# Patient Record
Sex: FEMALE | Race: WHITE | Employment: FULL TIME | URBAN - NONMETROPOLITAN AREA
[De-identification: names, ages, dates, MRNs, and addresses within clinical notes are randomized per-mention and may not be internally consistent; named-entity substitution may affect disease eponyms.]

---

## 2018-12-20 PROBLEM — I34.1 MITRAL VALVE PROLAPSE: Status: ACTIVE | Noted: 2018-12-20

## 2023-03-24 ENCOUNTER — OFFICE VISIT (OUTPATIENT)
Dept: CARDIOLOGY | Age: 63
End: 2023-03-24
Payer: COMMERCIAL

## 2023-03-24 VITALS
SYSTOLIC BLOOD PRESSURE: 110 MMHG | HEART RATE: 81 BPM | DIASTOLIC BLOOD PRESSURE: 66 MMHG | WEIGHT: 243 LBS | BODY MASS INDEX: 41.71 KG/M2

## 2023-03-24 DIAGNOSIS — I34.1 MITRAL VALVE PROLAPSE: Primary | ICD-10-CM

## 2023-03-24 DIAGNOSIS — E78.5 HYPERLIPIDEMIA, UNSPECIFIED HYPERLIPIDEMIA TYPE: ICD-10-CM

## 2023-03-24 PROCEDURE — 93000 ELECTROCARDIOGRAM COMPLETE: CPT | Performed by: SURGERY

## 2023-03-24 PROCEDURE — 99214 OFFICE O/P EST MOD 30 MIN: CPT | Performed by: SURGERY

## 2023-03-24 NOTE — PROGRESS NOTES
mg by mouth daily    Historical Provider, MD   Cholecalciferol (VITAMIN D) 2000 units CAPS capsule Take 1 capsule by mouth daily    Historical Provider, MD   Lactobacillus (PROBIOTIC ACIDOPHILUS PO) Take 1 tablet by mouth    Historical Provider, MD   NONFORMULARY 1 capsule every other day Tumeric    Historical Provider, MD   vitamin B-12 (CYANOCOBALAMIN) 500 MCG tablet Take 500 mcg by mouth daily    Historical Provider, MD       Allergies:  Sulfa antibiotics    Social History:   reports that she quit smoking about 8 years ago. Her smoking use included cigarettes. She started smoking about 53 years ago. She has a 6.25 pack-year smoking history. She has never used smokeless tobacco. She reports current alcohol use. She reports that she does not use drugs. Review of Systems:  Review of Systems   Constitutional:  Negative for chills, fatigue and fever. HENT:  Negative for congestion and dental problem. Respiratory:  Negative for chest tightness, shortness of breath and stridor. Cardiovascular:  Negative for chest pain and palpitations. Gastrointestinal:  Negative for abdominal distention, nausea and vomiting. Endocrine: Negative for cold intolerance and heat intolerance. Neurological:  Negative for dizziness and speech difficulty. Psychiatric/Behavioral:  Negative for agitation and behavioral problems. Physical Exam:  There were no vitals taken for this visit. Physical Exam  Constitutional:       Appearance: Normal appearance. HENT:      Head: Normocephalic and atraumatic. Mouth/Throat:      Mouth: Mucous membranes are moist.   Cardiovascular:      Rate and Rhythm: Normal rate and regular rhythm. Heart sounds: No murmur heard. Pulmonary:      Effort: Pulmonary effort is normal. No respiratory distress. Breath sounds: Normal breath sounds. Abdominal:      General: There is no distension. Palpations: There is no mass.    Musculoskeletal:         General: No swelling or

## 2023-09-05 DIAGNOSIS — I10 ESSENTIAL HYPERTENSION: ICD-10-CM

## 2023-09-05 RX ORDER — LOSARTAN POTASSIUM 100 MG/1
50 TABLET ORAL DAILY
Qty: 45 TABLET | Refills: 3 | Status: SHIPPED | OUTPATIENT
Start: 2023-09-05

## 2023-09-05 NOTE — TELEPHONE ENCOUNTER
Last Appt:  3/24/2023  Next Appt:   Visit date not found  Med verified in [address] [place] [address]

## 2023-11-27 RX ORDER — SPIRONOLACTONE 25 MG/1
25 TABLET ORAL DAILY
Qty: 90 TABLET | Refills: 3 | Status: SHIPPED | OUTPATIENT
Start: 2023-11-27

## 2024-04-08 ENCOUNTER — OFFICE VISIT (OUTPATIENT)
Dept: CARDIOLOGY | Age: 64
End: 2024-04-08
Payer: COMMERCIAL

## 2024-04-08 VITALS
BODY MASS INDEX: 40.63 KG/M2 | SYSTOLIC BLOOD PRESSURE: 136 MMHG | WEIGHT: 238 LBS | OXYGEN SATURATION: 99 % | HEART RATE: 73 BPM | DIASTOLIC BLOOD PRESSURE: 74 MMHG | HEIGHT: 64 IN

## 2024-04-08 DIAGNOSIS — E78.5 HYPERLIPIDEMIA, UNSPECIFIED HYPERLIPIDEMIA TYPE: ICD-10-CM

## 2024-04-08 DIAGNOSIS — I10 ESSENTIAL HYPERTENSION: Primary | ICD-10-CM

## 2024-04-08 PROCEDURE — 3078F DIAST BP <80 MM HG: CPT | Performed by: INTERNAL MEDICINE

## 2024-04-08 PROCEDURE — 93000 ELECTROCARDIOGRAM COMPLETE: CPT | Performed by: INTERNAL MEDICINE

## 2024-04-08 PROCEDURE — 3075F SYST BP GE 130 - 139MM HG: CPT | Performed by: INTERNAL MEDICINE

## 2024-04-08 PROCEDURE — 99214 OFFICE O/P EST MOD 30 MIN: CPT | Performed by: INTERNAL MEDICINE

## 2024-04-08 NOTE — PROGRESS NOTES
[facility], [facility] CARDIOLOG[facility] A DEPARTMENT OF [place] [address]  Dept: [phone]    Subjective:  The patient is a 63 y.o. year old, , female is in the office for a follow up visit.  She denies any chest pain or discomfort. No orthopnea or PND. Denies any palpitation, dizziness or syncope. She is completely asymptomatic from cardiac stand point.    Past Medical History:   has a past medical history of Asthma, Bilateral bunions, GERD (gastroesophageal reflux disease), Hyperopia with astigmatism and presbyopia, Kidney stones, Osteopenia, Seasonal allergies, and Vitamin D deficiency.    Past Surgical History:   has a past surgical history that includes Colonoscopy (7/3/2013); Wrist surgery (Right, 8/1/2008); Endometrial ablation (11/10/2003); hysteroscopy (8/14/1997); other surgical history (8/31/1993); pelvic laparoscopy (5/11/1993); hysteroscopy (6/7/1991); Dilation and curettage of uterus (6/2/1989); salpingectomy (1/17/1985); Dilation and curettage of uterus (4/18/1978); Tubal ligation; Hysterectomy (6/28/2004); Breast biopsy (Bilateral, 3/2013); Upper gastrointestinal endoscopy (12/30/2015); hip surgery (Right, 04/05/2019); eye surgery (Right, 03/11/2020); and Cataract removal (Right, 09/29/2020).    Home Medications:  Prior to Admission medications    Medication Sig Start Date End Date Taking? Authorizing Provider   spironolactone (ALDACTONE) 25 MG tablet Take 1 tablet by mouth daily 11/27/23  Yes Shaila Lara APRN - CNP   losartan (COZAAR) 100 MG tablet Take 0.5 tablets by mouth daily 9/5/23  Yes Shaila Lara APRN - CNP   albuterol sulfate  (90 Base) MCG/ACT inhaler INHALE 2 PUFFS BY MOUTH EVERY 4 HOURS AS NEEDED FOR WHEEZING FOR SHORTNESS OF BREATH 2/23/22  Yes Provider, MD Hal   fluticasone (FLONASE) 50 MCG/ACT nasal spray 1 spray by Nasal route daily

## 2024-05-21 DIAGNOSIS — I10 ESSENTIAL HYPERTENSION: ICD-10-CM

## 2024-05-21 RX ORDER — SPIRONOLACTONE 25 MG/1
25 TABLET ORAL DAILY
Qty: 90 TABLET | Refills: 3 | Status: SHIPPED | OUTPATIENT
Start: 2024-05-21

## 2024-05-21 RX ORDER — LOSARTAN POTASSIUM 100 MG/1
50 TABLET ORAL DAILY
Qty: 45 TABLET | Refills: 3 | Status: SHIPPED | OUTPATIENT
Start: 2024-05-21

## 2024-10-07 ENCOUNTER — OFFICE VISIT (OUTPATIENT)
Dept: CARDIOLOGY | Age: 64
End: 2024-10-07
Payer: COMMERCIAL

## 2024-10-07 VITALS
HEIGHT: 64 IN | DIASTOLIC BLOOD PRESSURE: 86 MMHG | HEART RATE: 68 BPM | BODY MASS INDEX: 39.95 KG/M2 | WEIGHT: 234 LBS | SYSTOLIC BLOOD PRESSURE: 164 MMHG

## 2024-10-07 DIAGNOSIS — I34.1 MITRAL VALVE PROLAPSE: Primary | ICD-10-CM

## 2024-10-07 DIAGNOSIS — I10 ESSENTIAL HYPERTENSION: ICD-10-CM

## 2024-10-07 PROCEDURE — 93000 ELECTROCARDIOGRAM COMPLETE: CPT | Performed by: NURSE PRACTITIONER

## 2024-10-07 PROCEDURE — 99214 OFFICE O/P EST MOD 30 MIN: CPT | Performed by: NURSE PRACTITIONER

## 2024-10-07 PROCEDURE — 3079F DIAST BP 80-89 MM HG: CPT | Performed by: NURSE PRACTITIONER

## 2024-10-07 PROCEDURE — 3077F SYST BP >= 140 MM HG: CPT | Performed by: NURSE PRACTITIONER

## 2024-10-07 RX ORDER — LOSARTAN POTASSIUM 50 MG/1
50 TABLET ORAL DAILY
Qty: 90 TABLET | Refills: 3 | Status: SHIPPED | OUTPATIENT
Start: 2024-10-07

## 2024-10-07 NOTE — PROGRESS NOTES
icterus.  ENT: No Headaches, hearing loss or vertigo. No mouth sores or sore throat.  Cardiovascular: As above.  Respiratory: No SOB, cough or hemoptysis.  Gastrointestinal: No abdominal pain, appetite loss, blood in stools. No change in bowel or bladder habits.  Genitourinary: No dysuria, trouble voiding, or hematuria.  Musculoskeletal:  No gait disturbance, No weakness or joint complaints.  Integumentary: No rash or pruritis.  Psychiatric: No anxiety, or depression.  Hematologic/Lymphatic: No abnormal bruising or bleeding, blood clots or swollen lymph nodes.  Allergic/Immunologic: No nasal congestion or hives.    Physical Exam:  Ht 1.619 m (5' 3.75\")   Wt 106.1 kg (234 lb)   BMI 40.48 kg/m²     Constitutional and General Appearance: alert, cooperative, no distress and appears stated age  HEENT: PERRL, no cervical lymphadenopathy. No masses palpable. Normal oral mucosa  Respiratory:  Normal excursion and expansion without use of accessory muscles  Resp Auscultation: Good respiratory effort. No for increased work of breathing. On auscultation: clear to auscultation bilaterally  Cardiovascular:  The apical impulse is not displaced  Heart tones are crisp and normal. regular S1 and S2.  Jugular venous pulsation Normal  The carotid upstroke is normal in amplitude and contour without delay or bruit  Peripheral pulses are symmetrical and full   Abdomen:   No masses or tenderness  Bowel sounds present  Extremities:   No Cyanosis or Clubbing   Lower extremity edema: No   Skin: Warm and dry    Cardiac Data:  EKG 10/7/24:   NSR pulse 68      Echo 5/25/18  Summary  Normal left ventricle size, wall thickness and function with an estimated EF  > 55%.  No segmental wall motion abnormalities seen.  Grade I (mild) left ventricular diastolic dysfunction.  Trivial tricuspid regurgitation.  RVSP is 34 mm Hg     Labs:     CBC: No results for input(s): \"WBC\", \"HGB\", \"HCT\", \"PLT\" in the last 72 hours.  BMP: No results for input(s):

## 2025-02-12 RX ORDER — SPIRONOLACTONE 25 MG/1
25 TABLET ORAL DAILY
Qty: 90 TABLET | Refills: 3 | Status: SHIPPED | OUTPATIENT
Start: 2025-02-12

## 2025-02-24 DIAGNOSIS — M79.671 RIGHT FOOT PAIN: ICD-10-CM

## 2025-02-24 DIAGNOSIS — M79.672 LEFT FOOT PAIN: Primary | ICD-10-CM

## 2025-03-06 ENCOUNTER — OFFICE VISIT (OUTPATIENT)
Dept: ORTHOPEDIC SURGERY | Age: 65
End: 2025-03-06
Payer: MEDICARE

## 2025-03-06 ENCOUNTER — TELEPHONE (OUTPATIENT)
Dept: ORTHOPEDIC SURGERY | Age: 65
End: 2025-03-06

## 2025-03-06 ENCOUNTER — HOSPITAL ENCOUNTER (OUTPATIENT)
Dept: GENERAL RADIOLOGY | Age: 65
Discharge: HOME | End: 2025-03-08
Attending: PODIATRIST
Payer: MEDICARE

## 2025-03-06 VITALS
BODY MASS INDEX: 39.95 KG/M2 | SYSTOLIC BLOOD PRESSURE: 138 MMHG | OXYGEN SATURATION: 97 % | HEART RATE: 66 BPM | HEIGHT: 64 IN | DIASTOLIC BLOOD PRESSURE: 78 MMHG | RESPIRATION RATE: 18 BRPM | WEIGHT: 234 LBS

## 2025-03-06 DIAGNOSIS — M21.619 BUNION OF GREAT TOE: ICD-10-CM

## 2025-03-06 DIAGNOSIS — M79.671 RIGHT FOOT PAIN: ICD-10-CM

## 2025-03-06 DIAGNOSIS — M20.5X1 HALLUX LIMITUS OF RIGHT FOOT: ICD-10-CM

## 2025-03-06 DIAGNOSIS — M79.671 BILATERAL FOOT PAIN: Primary | ICD-10-CM

## 2025-03-06 DIAGNOSIS — M79.672 BILATERAL FOOT PAIN: Primary | ICD-10-CM

## 2025-03-06 DIAGNOSIS — M79.672 LEFT FOOT PAIN: ICD-10-CM

## 2025-03-06 PROCEDURE — 73630 X-RAY EXAM OF FOOT: CPT

## 2025-03-06 PROCEDURE — 99214 OFFICE O/P EST MOD 30 MIN: CPT | Performed by: PODIATRIST

## 2025-03-06 RX ORDER — MONTELUKAST SODIUM 10 MG/1
1 TABLET ORAL NIGHTLY
COMMUNITY
Start: 2025-02-13

## 2025-03-06 RX ORDER — VIT C/VIT D3/E/ZINC/ELDERBERRY 65 MG-3.15
TABLET,CHEWABLE ORAL
COMMUNITY

## 2025-03-06 RX ORDER — ROSUVASTATIN CALCIUM 20 MG/1
20 TABLET, COATED ORAL NIGHTLY
COMMUNITY
Start: 2024-11-15

## 2025-03-06 NOTE — TELEPHONE ENCOUNTER
[facility]     Pre-Operative Evaluation/Consultation    Name:  Anna Bledsoe                                         Age:  64 y.o.  MRN:  [id number]       :  1960   Date:  3/6/2025         Sex: female        Surgeon:  Dr. Alatorre  Procedure (Planned):  right 1ST MPJ JOINT FUSION  Date Scheduled surgery: NOT SCHEDULED    Attending : Mandie att. providers found    Primary Physician: Pantera  Cardiologist:     Type of Anesthesia Requested: Anesthesia Provider's Choice    Patient Medical history:  Allergies   Allergen Reactions    Sulfa Antibiotics Hives     Social History     Tobacco Use    Smoking status: Former     Current packs/day: 0.00     Average packs/day: 0.3 packs/day for 44.9 years (11.2 ttl pk-yrs)     Types: Cigarettes     Start date: 1969     Quit date: 2014     Years since quitting: 10.8     Passive exposure: Past    Smokeless tobacco: Never    Tobacco comments:     quit    Vaping Use    Vaping status: Never Used   Substance Use Topics    Alcohol use: Yes     Alcohol/week: 0.0 standard drinks of alcohol     Comment: Rare alcohol.    Drug use: No           [] Sent to Anesthesia for your review:  25    [] Additional Orders: None     Comments:None   Requests: No Special requests    Signed: Jaja Charles LPN 3/6/2025 10:31 AM

## 2025-03-06 NOTE — PROGRESS NOTES
[facility]         Consult and Procedure Note      Anna Bledsoe  MEDICAL RECORD NUMBER:  [id number]  AGE: 64 y.o.   GENDER: female  : 1960  EPISODE DATE:  3/6/2025    Subjective:     Chief Complaint   Patient presents with    Foot Pain     New Patient- Bilateral foot pain         HISTORY of PRESENT ILLNESS HPI     Anna Bledsoe is a 64 y.o. female presenting for initial evaluation of bilateral foot ankle pain, right worse than left.  Patient states that she has been dealing with progressive bunion deformities for several years now but is progressively gotten worse over the last 6 months to a year.  Patient is status post right total hip replacement with my orthopedic partner Dr. Mike whom referred her to our clinic.  Patient is currently taking turmeric as an anti-inflammatory a prior medical history of underlying hypertension.  Patient had plain film x-rays obtained in clinic today consisting of a 3 view foot bilateral.  Patient today has attempted conservative modalities including shoe and activity modification digital splinting oral topical analgesics with little to no improvement.  Patient nature location of deformity discussed need for surgical intervention in the form of a right primary first metatarsal phalangeal joint arthrodesis.  All questions concerns regarding perioperative management including benefits risk complications and alternatives to procedure were discussed in detail.  This will serve as patient's preoperative history and physical for planned surgical intervention dated 4/3/2025        PAST MEDICAL HISTORY        Diagnosis Date    Asthma     no problems related to smoking    Bilateral bunions     GERD (gastroesophageal reflux disease)     Hyperopia with astigmatism and presbyopia     Kidney stones 2020    Osteopenia     Femur    Seasonal allergies     Vitamin D deficiency        PAST SURGICAL HISTORY    Past Surgical History:   Procedure Laterality Date

## 2025-03-06 NOTE — PROGRESS NOTES
Surgery request    Name: Anna Bledsoe  MRN: [id number]  Location: [facility]  Diagnosis: Right hallux limitus, hallux abductovalgus deformity  Procedure: Right first metatarsophalangeal joint arthrodesis/fusion  Outpatient/inpatient: Outpatient  Duration: 60 minutes  Position: Supine  Anesthesia: General  Block: Popliteal block  X-ray: C arm same side  Materials: Podiatry tray, [place] cordless power, K wire tray, [employer] medical crosscheck fusion plate  Date: 4/3/2025

## 2025-03-10 ENCOUNTER — TELEPHONE (OUTPATIENT)
Dept: CARDIOLOGY | Age: 65
End: 2025-03-10

## 2025-03-10 ENCOUNTER — OFFICE VISIT (OUTPATIENT)
Dept: CARDIOLOGY | Age: 65
End: 2025-03-10
Payer: MEDICARE

## 2025-03-10 VITALS
WEIGHT: 237 LBS | HEART RATE: 68 BPM | DIASTOLIC BLOOD PRESSURE: 68 MMHG | SYSTOLIC BLOOD PRESSURE: 138 MMHG | BODY MASS INDEX: 40.46 KG/M2 | HEIGHT: 64 IN

## 2025-03-10 DIAGNOSIS — Z01.810 PREPROCEDURAL CARDIOVASCULAR EXAMINATION: ICD-10-CM

## 2025-03-10 DIAGNOSIS — R68.89 EXERCISE INTOLERANCE: ICD-10-CM

## 2025-03-10 DIAGNOSIS — I34.1 MITRAL VALVE PROLAPSE: ICD-10-CM

## 2025-03-10 DIAGNOSIS — I10 ESSENTIAL HYPERTENSION: ICD-10-CM

## 2025-03-10 DIAGNOSIS — E78.5 HYPERLIPIDEMIA, UNSPECIFIED HYPERLIPIDEMIA TYPE: Primary | ICD-10-CM

## 2025-03-10 PROCEDURE — 3078F DIAST BP <80 MM HG: CPT | Performed by: INTERNAL MEDICINE

## 2025-03-10 PROCEDURE — 1036F TOBACCO NON-USER: CPT | Performed by: INTERNAL MEDICINE

## 2025-03-10 PROCEDURE — 99214 OFFICE O/P EST MOD 30 MIN: CPT | Performed by: INTERNAL MEDICINE

## 2025-03-10 PROCEDURE — 3017F COLORECTAL CA SCREEN DOC REV: CPT | Performed by: INTERNAL MEDICINE

## 2025-03-10 PROCEDURE — 1090F PRES/ABSN URINE INCON ASSESS: CPT | Performed by: INTERNAL MEDICINE

## 2025-03-10 PROCEDURE — 93010 ELECTROCARDIOGRAM REPORT: CPT | Performed by: INTERNAL MEDICINE

## 2025-03-10 PROCEDURE — G8427 DOCREV CUR MEDS BY ELIG CLIN: HCPCS | Performed by: INTERNAL MEDICINE

## 2025-03-10 PROCEDURE — 3075F SYST BP GE 130 - 139MM HG: CPT | Performed by: INTERNAL MEDICINE

## 2025-03-10 PROCEDURE — 1123F ACP DISCUSS/DSCN MKR DOCD: CPT | Performed by: INTERNAL MEDICINE

## 2025-03-10 PROCEDURE — G8400 PT W/DXA NO RESULTS DOC: HCPCS | Performed by: INTERNAL MEDICINE

## 2025-03-10 PROCEDURE — G8417 CALC BMI ABV UP PARAM F/U: HCPCS | Performed by: INTERNAL MEDICINE

## 2025-03-10 PROCEDURE — 93005 ELECTROCARDIOGRAM TRACING: CPT | Performed by: INTERNAL MEDICINE

## 2025-03-10 NOTE — TELEPHONE ENCOUNTER
IMPRESSION/RECOMMENDATIONS:  1. Preop Risk Stratification prior to foot surgery under general anesthesia.  - Unfortunately has limited functional capacity  - Therefore we will check 2D echo, if echocardiogram is low risk the patient may proceed at a moderate risk     2.  Hypertension.  Controlled.  Continue current medications.     3.  Dyslipidemia.  Unable to tolerate statins.  - Labs from 11/24 revealed total cholesterol 242, triglycerides of 130, LDL of 164     4.  History of mitral valve prolapse.  - Update 2D echo as described in #1       Galen Markham DO, FACC, FACOI, RPVI, PORTILLO WEINSTEIN  [facility]  [email or web address]  [phone]    PEND Echo 3/17    Last Appt:  3/10/2025  Next Appt:   Visit date not found  Med verified in Epic

## 2025-03-10 NOTE — PROGRESS NOTES
[facility]   Consult / Follow Up       Anna Bledsoe  [id number]  1960    03/10/25     CC: follow up     HPI:  The patient is a 64 y.o. year old, , female is in the office for a follow up visit.  Going for bunion surgery.   Will be under general anesthesia.   Needs preop risk prior to surgery.   No cp.   No sob.   No palpitations.   No le edema.   No syncope.   Not too active, due to her feet.   Cant climb 1-2 flights of stairs and walk 1-2 blocks.   Joined the [employer] now but waiting for surgery before she gets active.     Past Medical History:   has a past medical history of Asthma, Bilateral bunions, GERD (gastroesophageal reflux disease), Hyperopia with astigmatism and presbyopia, Kidney stones, Osteopenia, Seasonal allergies, and Vitamin D deficiency.    Past Surgical History:   has a past surgical history that includes Colonoscopy (7/3/2013); Wrist surgery (Right, 8/1/2008); Endometrial ablation (11/10/2003); hysteroscopy (8/14/1997); other surgical history (8/31/1993); pelvic laparoscopy (5/11/1993); hysteroscopy (6/7/1991); Dilation and curettage of uterus (6/2/1989); salpingectomy (1/17/1985); Dilation and curettage of uterus (4/18/1978); Tubal ligation; Hysterectomy (6/28/2004); Breast biopsy (Bilateral, 3/2013); Upper gastrointestinal endoscopy (12/30/2015); hip surgery (Right, 04/05/2019); eye surgery (Right, 03/11/2020); and Cataract removal (Right, 09/29/2020).    Home Medications:  Prior to Admission medications    Medication Sig Start Date End Date Taking? Authorizing Provider   [facility] Natural Products (AIRBORNE ELDERBERRY) 100-50 MG CHEW Take by mouth   Yes ProviderHal MD   rosuvastatin (CRESTOR) 20 MG tablet Take 1 tablet by mouth nightly 11/15/24  Yes ProviderHal MD   spironolactone (ALDACTONE) 25 MG tablet TAKE 1 TABLET BY MOUTH DAILY 2/12/25  Yes Shaila Lara, APRN - CNP   TURMERIC PO Take 1 capsule by mouth in the morning, at noon, and

## 2025-03-17 ENCOUNTER — HOSPITAL ENCOUNTER (OUTPATIENT)
Age: 65
Discharge: HOME | End: 2025-03-19
Attending: INTERNAL MEDICINE
Payer: MEDICARE

## 2025-03-17 VITALS — WEIGHT: 237 LBS | HEIGHT: 64 IN | BODY MASS INDEX: 40.46 KG/M2

## 2025-03-17 DIAGNOSIS — R68.89 EXERCISE INTOLERANCE: ICD-10-CM

## 2025-03-17 DIAGNOSIS — I34.1 MITRAL VALVE PROLAPSE: ICD-10-CM

## 2025-03-17 DIAGNOSIS — Z01.810 PREPROCEDURAL CARDIOVASCULAR EXAMINATION: ICD-10-CM

## 2025-03-17 DIAGNOSIS — E78.5 HYPERLIPIDEMIA, UNSPECIFIED HYPERLIPIDEMIA TYPE: ICD-10-CM

## 2025-03-17 DIAGNOSIS — I10 ESSENTIAL HYPERTENSION: ICD-10-CM

## 2025-03-17 LAB
ECHO AO ROOT DIAM: 2.8 CM
ECHO AO ROOT INDEX: 1.33 CM/M2
ECHO AV AREA PEAK VELOCITY: 2 CM2
ECHO AV AREA VTI: 2 CM2
ECHO AV AREA/BSA PEAK VELOCITY: 1 CM2/M2
ECHO AV AREA/BSA VTI: 1 CM2/M2
ECHO AV MEAN GRADIENT: 6 MMHG
ECHO AV MEAN VELOCITY: 1.1 M/S
ECHO AV PEAK GRADIENT: 11 MMHG
ECHO AV PEAK GRADIENT: 11 MMHG
ECHO AV PEAK VELOCITY: 1.6 M/S
ECHO AV VELOCITY RATIO: 0.69
ECHO AV VTI: 39.1 CM
ECHO BSA: 2.2 M2
ECHO EST RA PRESSURE: 3 MMHG
ECHO LA AREA 2C: 22.6 CM2
ECHO LA AREA 4C: 22.3 CM2
ECHO LA DIAMETER INDEX: 1.76 CM/M2
ECHO LA DIAMETER: 3.7 CM
ECHO LA MAJOR AXIS: 6.1 CM
ECHO LA MINOR AXIS: 6 CM
ECHO LA TO AORTIC ROOT RATIO: 1.32
ECHO LA VOL BP: 67 ML (ref 22–52)
ECHO LA VOL MOD A2C: 70 ML (ref 22–52)
ECHO LA VOL MOD A4C: 64 ML (ref 22–52)
ECHO LA VOL/BSA BIPLANE: 32 ML/M2 (ref 16–34)
ECHO LA VOLUME INDEX MOD A2C: 33 ML/M2 (ref 16–34)
ECHO LA VOLUME INDEX MOD A4C: 30 ML/M2 (ref 16–34)
ECHO LV E' LATERAL VELOCITY: 7.51 CM/S
ECHO LV E' SEPTAL VELOCITY: 6.85 CM/S
ECHO LV EDV A2C: 89 ML
ECHO LV EDV A4C: 92 ML
ECHO LV EDV INDEX A4C: 44 ML/M2
ECHO LV EDV NDEX A2C: 42 ML/M2
ECHO LV EF PHYSICIAN: 60 %
ECHO LV EJECTION FRACTION A2C: 63 %
ECHO LV EJECTION FRACTION A4C: 53 %
ECHO LV EJECTION FRACTION BIPLANE: 58 % (ref 55–100)
ECHO LV ESV A2C: 33 ML
ECHO LV ESV A4C: 43 ML
ECHO LV ESV INDEX A2C: 16 ML/M2
ECHO LV ESV INDEX A4C: 20 ML/M2
ECHO LV FRACTIONAL SHORTENING: 48 % (ref 28–44)
ECHO LV INTERNAL DIMENSION DIASTOLE INDEX: 2.29 CM/M2
ECHO LV INTERNAL DIMENSION DIASTOLIC: 4.8 CM (ref 3.9–5.3)
ECHO LV INTERNAL DIMENSION SYSTOLIC INDEX: 1.19 CM/M2
ECHO LV INTERNAL DIMENSION SYSTOLIC: 2.5 CM
ECHO LV IVSD: 1.3 CM (ref 0.6–0.9)
ECHO LV MASS 2D: 259.6 G (ref 67–162)
ECHO LV MASS INDEX 2D: 123.6 G/M2 (ref 43–95)
ECHO LV POSTERIOR WALL DIASTOLIC: 1.4 CM (ref 0.6–0.9)
ECHO LV RELATIVE WALL THICKNESS RATIO: 0.58
ECHO LVOT AREA: 2.8 CM2
ECHO LVOT AV VTI INDEX: 0.72
ECHO LVOT DIAM: 1.9 CM
ECHO LVOT MEAN GRADIENT: 3 MMHG
ECHO LVOT PEAK GRADIENT: 5 MMHG
ECHO LVOT PEAK VELOCITY: 1.1 M/S
ECHO LVOT STROKE VOLUME INDEX: 37.9 ML/M2
ECHO LVOT SV: 79.6 ML
ECHO LVOT VTI: 28.1 CM
ECHO MV A VELOCITY: 1.18 M/S
ECHO MV E DECELERATION TIME (DT): 190 MS
ECHO MV E VELOCITY: 0.86 M/S
ECHO MV E/A RATIO: 0.73
ECHO MV E/E' LATERAL: 11.45
ECHO MV E/E' RATIO (AVERAGED): 12
ECHO MV E/E' SEPTAL: 12.55
ECHO RIGHT VENTRICULAR SYSTOLIC PRESSURE (RVSP): 25 MMHG
ECHO RV TAPSE: 3 CM (ref 1.7–?)
ECHO TV REGURGITANT MAX VELOCITY: 2.36 M/S
ECHO TV REGURGITANT PEAK GRADIENT: 22 MMHG

## 2025-03-17 PROCEDURE — 93306 TTE W/DOPPLER COMPLETE: CPT

## 2025-03-17 PROCEDURE — 93306 TTE W/DOPPLER COMPLETE: CPT | Performed by: INTERNAL MEDICINE

## 2025-03-17 NOTE — TELEPHONE ENCOUNTER
Patient notified of echo results and clearance for surgery. Pt verbalized understanding and had no further questions at the time .

## 2025-03-17 NOTE — TELEPHONE ENCOUNTER
Interpretation Summary      Left Ventricle: Normal left ventricular systolic function. EF by visual approximation is 60%. EF by 2D Simpsons Biplane is 58%. Left ventricle size is normal. Mildly increased wall thickness. Normal wall motion. Abnormal diastolic function.    Aortic Valve: Mildly thickened cusps.    Mitral Valve: Mild regurgitation.    Tricuspid Valve: Mild regurgitation.    Image quality is adequate.        Echo Findings    Left Ventricle Normal left ventricular systolic function. EF by visual approximation is 60%. EF by 2D Simpsons Biplane is 58%. Left ventricle size is normal. Mildly increased wall thickness. Normal wall motion. Abnormal diastolic function.   Left Atrium Left atrium size is normal. LA Vol Index is  32 ml/m2 mL/m2.   Right Ventricle Right ventricle size is normal. Normal systolic function.   Right Atrium Right atrium size is normal.   Aortic Valve Mildly thickened cusps. No regurgitation. No stenosis.   Mitral Valve Valve structure is normal. Mild regurgitation. No stenosis noted.   Tricuspid Valve Valve structure is normal. Mild regurgitation. No stenosis noted. Normal RVSP.   Pulmonic Valve The pulmonic valve was not well visualized. Physiologically normal regurgitation. No stenosis noted.   Aorta Normal sized aortic root and ascending aorta.   IVC/Hepatic Veins IVC diameter is less than or equal to 21 mm and decreases greater than 50% during inspiration; therefore the estimated right atrial pressure is normal (~3 mmHg). IVC size is normal.   Pericardium No pericardial effusion.

## 2025-03-17 NOTE — TELEPHONE ENCOUNTER
Cardiology Consultation  [phone]      03/17/25       Regarding:  Anna Bledsoe  1960      To Whom It May Concern,      Patient is Intermediate Cardiac Risk for planned surgery.      Special instructions:  Patient is taking Aspirin   and can be held for 5-7 days prior to procedure and resumed as soon as surgical bleeding risk has resolved.     Please continue other meds.        Thank you,      Galen aMrkham DO, FACC, FACOI, RPVI, CORINNA, PORTILLO  Cardiology  [phone]    Electronically signed by Galen Markham DO

## 2025-03-24 ENCOUNTER — CLINICAL SUPPORT (OUTPATIENT)
Dept: ORTHOPEDIC SURGERY | Age: 65
End: 2025-03-24
Payer: MEDICARE

## 2025-03-24 DIAGNOSIS — Z98.890 STATUS POST SURGERY: Primary | ICD-10-CM

## 2025-03-24 PROCEDURE — 99215 OFFICE O/P EST HI 40 MIN: CPT

## 2025-03-24 NOTE — PROGRESS NOTES
Patient here. Procedure and instructions reviewed with patient. Patient voiced understanding. Patient schedule here at [facility] on 4/3/25. Patient initially thought she could use crutches around her house, once shown how to use them, she decided these would not be best for her. She will obtain a knee scooter for mobility.  She will call with questions or concerns.

## 2025-03-25 ENCOUNTER — TELEPHONE (OUTPATIENT)
Dept: ORTHOPEDIC SURGERY | Age: 65
End: 2025-03-25

## 2025-03-25 NOTE — TELEPHONE ENCOUNTER
Patient would like to reschedule surgery she stated her  is getting stents put in and so she would like to wait until July for surgery with Dr. Alatorre

## 2025-07-03 ENCOUNTER — HOSPITAL ENCOUNTER (OUTPATIENT)
Age: 65
Setting detail: OUTPATIENT SURGERY
Discharge: HOME | End: 2025-07-03
Attending: PODIATRIST | Admitting: PODIATRIST
Payer: MEDICARE

## 2025-07-03 ENCOUNTER — APPOINTMENT (OUTPATIENT)
Dept: GENERAL RADIOLOGY | Age: 65
End: 2025-07-03
Attending: PODIATRIST
Payer: MEDICARE

## 2025-07-03 ENCOUNTER — ANESTHESIA EVENT (OUTPATIENT)
Dept: OPERATING ROOM | Age: 65
End: 2025-07-03
Payer: MEDICARE

## 2025-07-03 ENCOUNTER — ANESTHESIA (OUTPATIENT)
Dept: OPERATING ROOM | Age: 65
End: 2025-07-03
Payer: MEDICARE

## 2025-07-03 VITALS
HEART RATE: 58 BPM | OXYGEN SATURATION: 98 % | BODY MASS INDEX: 40.46 KG/M2 | WEIGHT: 237 LBS | RESPIRATION RATE: 16 BRPM | TEMPERATURE: 96.8 F | DIASTOLIC BLOOD PRESSURE: 49 MMHG | HEIGHT: 64 IN | SYSTOLIC BLOOD PRESSURE: 152 MMHG

## 2025-07-03 DIAGNOSIS — Z41.9 ELECTIVE SURGERY: Primary | ICD-10-CM

## 2025-07-03 PROBLEM — M20.5X1 HALLUX LIMITUS OF RIGHT FOOT: Status: ACTIVE | Noted: 2025-07-03

## 2025-07-03 PROCEDURE — C1769 GUIDE WIRE: HCPCS | Performed by: PODIATRIST

## 2025-07-03 PROCEDURE — 28750 FUSION OF BIG TOE JOINT: CPT | Performed by: PODIATRIST

## 2025-07-03 PROCEDURE — 3600000012 HC SURGERY LEVEL 2 ADDTL 15MIN: Performed by: PODIATRIST

## 2025-07-03 PROCEDURE — 7100000010 HC PHASE II RECOVERY - FIRST 15 MIN: Performed by: PODIATRIST

## 2025-07-03 PROCEDURE — 64445 NJX AA&/STRD SCIATIC NRV IMG: CPT | Performed by: NURSE ANESTHETIST, CERTIFIED REGISTERED

## 2025-07-03 PROCEDURE — 3700000001 HC ADD 15 MINUTES (ANESTHESIA): Performed by: PODIATRIST

## 2025-07-03 PROCEDURE — 2709999900 HC NON-CHARGEABLE SUPPLY: Performed by: PODIATRIST

## 2025-07-03 PROCEDURE — 6360000002 HC RX W HCPCS: Performed by: PODIATRIST

## 2025-07-03 PROCEDURE — 6360000002 HC RX W HCPCS: Performed by: NURSE ANESTHETIST, CERTIFIED REGISTERED

## 2025-07-03 PROCEDURE — 7100000011 HC PHASE II RECOVERY - ADDTL 15 MIN: Performed by: PODIATRIST

## 2025-07-03 PROCEDURE — 2580000003 HC RX 258: Performed by: PODIATRIST

## 2025-07-03 PROCEDURE — 3600000002 HC SURGERY LEVEL 2 BASE: Performed by: PODIATRIST

## 2025-07-03 PROCEDURE — 3700000000 HC ANESTHESIA ATTENDED CARE: Performed by: PODIATRIST

## 2025-07-03 PROCEDURE — 2720000010 HC SURG SUPPLY STERILE: Performed by: PODIATRIST

## 2025-07-03 PROCEDURE — C1713 ANCHOR/SCREW BN/BN,TIS/BN: HCPCS | Performed by: PODIATRIST

## 2025-07-03 DEVICE — IMPLANTABLE DEVICE
Type: IMPLANTABLE DEVICE | Site: FOOT | Status: FUNCTIONAL
Brand: ORTHOLOC 3DI

## 2025-07-03 DEVICE — IMPLANTABLE DEVICE
Type: IMPLANTABLE DEVICE | Site: FOOT | Status: FUNCTIONAL
Brand: ORTHOLOC

## 2025-07-03 DEVICE — MTP FUSION PLATE
Type: IMPLANTABLE DEVICE | Site: FOOT | Status: FUNCTIONAL
Brand: ORTHOLOC 3DI

## 2025-07-03 DEVICE — CANNULATED SCREW
Type: IMPLANTABLE DEVICE | Site: FOOT | Status: FUNCTIONAL
Brand: ASNIS

## 2025-07-03 DEVICE — SINGLE TROCAR WIRE
Type: IMPLANTABLE DEVICE | Site: FOOT | Status: FUNCTIONAL
Brand: CHARLOTTE

## 2025-07-03 RX ORDER — SODIUM CHLORIDE, SODIUM LACTATE, POTASSIUM CHLORIDE, CALCIUM CHLORIDE 600; 310; 30; 20 MG/100ML; MG/100ML; MG/100ML; MG/100ML
INJECTION, SOLUTION INTRAVENOUS CONTINUOUS
Status: DISCONTINUED | OUTPATIENT
Start: 2025-07-03 | End: 2025-07-03 | Stop reason: HOSPADM

## 2025-07-03 RX ORDER — LIDOCAINE HYDROCHLORIDE 10 MG/ML
INJECTION, SOLUTION EPIDURAL; INFILTRATION; INTRACAUDAL; PERINEURAL
Status: DISCONTINUED | OUTPATIENT
Start: 2025-07-03 | End: 2025-07-03 | Stop reason: SDUPTHER

## 2025-07-03 RX ORDER — CYCLOBENZAPRINE HCL 5 MG
10 TABLET ORAL 3 TIMES DAILY PRN
Qty: 40 TABLET | Refills: 0 | Status: SHIPPED | OUTPATIENT
Start: 2025-07-03 | End: 2025-07-13

## 2025-07-03 RX ORDER — SODIUM CHLORIDE 9 MG/ML
INJECTION, SOLUTION INTRAVENOUS PRN
Status: CANCELLED | OUTPATIENT
Start: 2025-07-03

## 2025-07-03 RX ORDER — BUPIVACAINE HYDROCHLORIDE 2.5 MG/ML
INJECTION, SOLUTION EPIDURAL; INFILTRATION; INTRACAUDAL; PERINEURAL
Status: COMPLETED | OUTPATIENT
Start: 2025-07-03 | End: 2025-07-03

## 2025-07-03 RX ORDER — MIDAZOLAM HYDROCHLORIDE 1 MG/ML
INJECTION, SOLUTION INTRAMUSCULAR; INTRAVENOUS
Status: DISCONTINUED | OUTPATIENT
Start: 2025-07-03 | End: 2025-07-03 | Stop reason: SDUPTHER

## 2025-07-03 RX ORDER — ASPIRIN 325 MG
325 TABLET ORAL DAILY
Qty: 14 TABLET | Refills: 0 | Status: SHIPPED | OUTPATIENT
Start: 2025-07-03 | End: 2025-07-17

## 2025-07-03 RX ORDER — PROPOFOL 10 MG/ML
INJECTION, EMULSION INTRAVENOUS
Status: DISCONTINUED | OUTPATIENT
Start: 2025-07-03 | End: 2025-07-03 | Stop reason: SDUPTHER

## 2025-07-03 RX ORDER — PHENYLEPHRINE HYDROCHLORIDE 10 MG/ML
INJECTION INTRAVENOUS
Status: DISCONTINUED | OUTPATIENT
Start: 2025-07-03 | End: 2025-07-03 | Stop reason: SDUPTHER

## 2025-07-03 RX ORDER — SODIUM CHLORIDE 0.9 % (FLUSH) 0.9 %
5-40 SYRINGE (ML) INJECTION PRN
Status: CANCELLED | OUTPATIENT
Start: 2025-07-03

## 2025-07-03 RX ORDER — FENTANYL CITRATE 50 UG/ML
INJECTION, SOLUTION INTRAMUSCULAR; INTRAVENOUS
Status: DISCONTINUED | OUTPATIENT
Start: 2025-07-03 | End: 2025-07-03 | Stop reason: SDUPTHER

## 2025-07-03 RX ORDER — OXYCODONE HYDROCHLORIDE 5 MG/1
5 TABLET ORAL EVERY 4 HOURS PRN
Refills: 0 | Status: CANCELLED | OUTPATIENT
Start: 2025-07-03

## 2025-07-03 RX ORDER — SODIUM CHLORIDE 0.9 % (FLUSH) 0.9 %
5-40 SYRINGE (ML) INJECTION EVERY 12 HOURS SCHEDULED
Status: CANCELLED | OUTPATIENT
Start: 2025-07-03

## 2025-07-03 RX ORDER — HYDROCODONE BITARTRATE AND ACETAMINOPHEN 5; 325 MG/1; MG/1
1 TABLET ORAL EVERY 4 HOURS PRN
Qty: 42 TABLET | Refills: 0 | Status: SHIPPED | OUTPATIENT
Start: 2025-07-03 | End: 2025-07-10

## 2025-07-03 RX ORDER — OXYCODONE HYDROCHLORIDE 5 MG/1
10 TABLET ORAL EVERY 4 HOURS PRN
Refills: 0 | Status: CANCELLED | OUTPATIENT
Start: 2025-07-03

## 2025-07-03 RX ORDER — ONDANSETRON 4 MG/1
4 TABLET, ORALLY DISINTEGRATING ORAL EVERY 8 HOURS PRN
Status: CANCELLED | OUTPATIENT
Start: 2025-07-03

## 2025-07-03 RX ORDER — ONDANSETRON 2 MG/ML
4 INJECTION INTRAMUSCULAR; INTRAVENOUS EVERY 6 HOURS PRN
Status: CANCELLED | OUTPATIENT
Start: 2025-07-03

## 2025-07-03 RX ORDER — CEFADROXIL 500 MG/1
500 CAPSULE ORAL 2 TIMES DAILY
Qty: 20 CAPSULE | Refills: 0 | Status: SHIPPED | OUTPATIENT
Start: 2025-07-03 | End: 2025-07-13

## 2025-07-03 RX ADMIN — BUPIVACAINE HYDROCHLORIDE 30 ML: 2.5 INJECTION, SOLUTION EPIDURAL; INFILTRATION; INTRACAUDAL; PERINEURAL at 12:10

## 2025-07-03 RX ADMIN — PHENYLEPHRINE HYDROCHLORIDE 100 MCG: 10 INJECTION INTRAVENOUS at 13:08

## 2025-07-03 RX ADMIN — FENTANYL CITRATE 100 MCG: 50 INJECTION, SOLUTION INTRAMUSCULAR; INTRAVENOUS at 12:30

## 2025-07-03 RX ADMIN — LIDOCAINE HYDROCHLORIDE 5 ML: 10 INJECTION, SOLUTION EPIDURAL; INFILTRATION; INTRACAUDAL; PERINEURAL at 12:23

## 2025-07-03 RX ADMIN — Medication 2000 MG: at 12:23

## 2025-07-03 RX ADMIN — MIDAZOLAM HYDROCHLORIDE 2 MG: 1 INJECTION, SOLUTION INTRAMUSCULAR; INTRAVENOUS at 12:14

## 2025-07-03 RX ADMIN — PROPOFOL 200 MG: 10 INJECTION, EMULSION INTRAVENOUS at 12:23

## 2025-07-03 RX ADMIN — SODIUM CHLORIDE, SODIUM LACTATE, POTASSIUM CHLORIDE, AND CALCIUM CHLORIDE: .6; .31; .03; .02 INJECTION, SOLUTION INTRAVENOUS at 12:04

## 2025-07-03 RX ADMIN — PROPOFOL 150 MCG/KG/MIN: 10 INJECTION, EMULSION INTRAVENOUS at 12:24

## 2025-07-03 ASSESSMENT — PAIN SCALES - GENERAL
PAINLEVEL_OUTOF10: 0

## 2025-07-03 ASSESSMENT — PAIN - FUNCTIONAL ASSESSMENT
PAIN_FUNCTIONAL_ASSESSMENT: 0-10
PAIN_FUNCTIONAL_ASSESSMENT: 0-10

## 2025-07-03 NOTE — ANESTHESIA PROCEDURE NOTES
Peripheral Block    Patient location during procedure: pre-op  Reason for block: post-op pain management and at surgeon's request  Start time: 7/3/2025 12:10 PM  End time: 7/3/2025 12:14 PM  Staffing  Performed: resident/CRNA   Resident/CRNA: Marquez Hanson II, APRN - CRNA  Performed by: Marquez Hanson II, APRN - CRNA  Authorized by: Marquez Hanson II, APRN - CRNA    Preanesthetic Checklist  Completed: patient identified, IV checked, site marked, risks and benefits discussed, surgical/procedural consents, equipment checked, pre-op evaluation, timeout performed, anesthesia consent given, oxygen available and monitors applied/VS acknowledged  Peripheral Block   Patient position: left lateral decubitus  Prep: ChloraPrep  Provider prep: mask, sterile gloves and sterile gown  Patient monitoring: continuous pulse ox, cardiac monitor, continuous capnometry, IV access, frequent blood pressure checks, oxygen and responsive to questions  Block type: Sciatic  Popliteal  Laterality: right  Injection technique: single-shot  Guidance: ultrasound guided  Local infiltration: lidocaine  Infiltration strength: 2 %  Local infiltration: lidocaine  Dose: 2 mL    Needle   Needle type: insulated echogenic nerve stimulator needle   Needle gauge: 21 G  Needle localization: ultrasound guidance  Needle length: 10 cm  Assessment   Injection assessment: negative aspiration for heme, no paresthesia on injection, local visualized surrounding nerve on ultrasound and no intravascular symptoms  Slow fractionated injection: yes  Hemodynamics: stable    Medications Administered  BUPivacaine (MARCAINE) PF injection 0.25% - Perineural   30 mL - 7/3/2025 12:10:00 PM

## 2025-07-03 NOTE — BRIEF OP NOTE
Brief Postoperative Note      Patient: Anna Bledsoe  YOB: 1960  MRN: [id number]    Date of Procedure: 7/3/2025    Pre-Op Diagnosis Codes:      * Bunion, right foot [M21.611]  Hallux limitus    Post-Op Diagnosis: Same       Procedure(s):  Right Foot 1st Metatarsal Phalangeal Joint Fusion    Surgeon(s):  Nikunj Alatorre DPM    Assistant:  Joyce Hong, PGY II    Anesthesia: General    Estimated Blood Loss (mL): Minimal    Complications: None    Specimens:   * No specimens in log *    Implants:  Implant Name Type Inv. Item Serial No.  Lot No. LRB No. Used Action   SCREW BNE L14MM DIA3.5MM CINDY FT ANK TI JENNIFER FULL THRD FOR - [id number]  SCREW BNE L14MM DIA3.5MM CINDY FT ANK TI JENNIFER FULL THRD FOR  [employer] [facility]  Right 2 Implanted   SCREW BNE L12MM DIA3.5MM CINDY TI POLYAX JENNIFER FULL THRD FOR - [id number]  SCREW BNE L12MM DIA3.5MM CINDY TI POLYAX JENNIFER FULL THRD FOR  [employer] [facility]  Right 2 Implanted   PLATE BNE L47MM 0DEG M 6 H R MT TI POLYAX JENNIFER COMPR LO PROF - [id number]  PLATE BNE L47MM 0DEG M 6 H R MT TI POLYAX JENNIFER COMPR LO PROF  [employer] [facility]  Right 1 Implanted   SCREW BNE L38MM DIA4MM TI ALLY SELF DRL ST SHRUTHI PARTIALLY - [id number]  SCREW BNE L38MM DIA4MM TI ALLY SELF DRL ST SHRUTHI PARTIALLY  [employer] [facility]  Right 1 Implanted   PLATE SPNL LCK 35 MM FOR SET SCR SP-FIX - [id number]  PLATE SPNL LCK 35 MM FOR SET SCR SP-FIX  [employer] [facility]  Right 1 Implanted   K WIRE FIX L150MM DIA1.6MM SGL TRCR FOR POLYAX COMPR - [id number]  K WIRE FIX L150MM DIA1.6MM SGL TRCR FOR POLYAX COMPR  [employer] [facility]  Right 1 Implanted         Drains: * No LDAs found *    Findings:  Infection Present At Time Of Surgery (PATOS) (choose all levels that have infection present):  No infection present  Other Findings: End-stage arthrosis first metatarsophalangeal joint with concomitant first tarsometatarsal joint

## 2025-07-03 NOTE — H&P
Department of Surgery  H&P Interval Note  Outpatient History and Physical was reviewed pre-operatively, with no interval changes to note prior to scheduled surgical intervention. Patient was assessed, all questions/concerns regarding bertram-operative management were addressed to patient satisfaction. Operative site was marked prior to transportation to the operative room.     Nikunj Alatorre D.P.M.

## 2025-07-03 NOTE — ANESTHESIA POSTPROCEDURE EVALUATION
Department of Anesthesiology  Postprocedure Note    Patient: Anna Bledsoe  MRN: [id number]  YOB: 1960  Date of evaluation: 7/3/2025    Procedure Summary       Date: 07/03/25 Room / Location: [address]    Anesthesia Start: 1221 Anesthesia Stop: 1334    Procedure: Right Foot 1st Metatarsal Phalangeal Joint Fusion (Right: Foot) Diagnosis:       Bunion, right foot      (Bunion, right foot [M21.611])    Surgeons: Nikunj Alatorre DPM Responsible Provider: Marquez Hanson II, APRN - CRNA    Anesthesia Type: General, TIVA ASA Status: 3            Anesthesia Type: General, TIVA    Seven Phase I: Seven Score: 10    Seven Phase II:      Anesthesia Post Evaluation    Patient location during evaluation: bedside  Patient participation: complete - patient participated  Airway patency: patent  Nausea & Vomiting: no nausea and no vomiting  Cardiovascular status: hemodynamically stable  Respiratory status: spontaneous ventilation  Hydration status: stable  Pain management: satisfactory to patient    No notable events documented.

## 2025-07-03 NOTE — H&P
[facility]         History and Physical     Anna Bledsoe  MEDICAL RECORD NUMBER:  [id number]  AGE: 64 y.o.   GENDER: female  : 1960  EPISODE DATE: 25      Subjective:           Chief Complaint   Patient presents with    Foot Pain       New Patient- Bilateral foot pain         HISTORY of PRESENT ILLNESS HPI     Subjective  Anna Bledsoe is a 64 y.o. female presenting for initial evaluation of bilateral foot ankle pain, right worse than left.  Patient states that she has been dealing with progressive bunion deformities for several years now but is progressively gotten worse over the last 6 months to a year.  Patient is status post right total hip replacement with my orthopedic partner Dr. Mike whom referred her to our clinic.  Patient is currently taking turmeric as an anti-inflammatory a prior medical history of underlying hypertension.  Patient had plain film x-rays obtained in clinic today consisting of a 3 view foot bilateral.  Patient today has attempted conservative modalities including shoe and activity modification digital splinting oral topical analgesics with little to no improvement.  Patient nature location of deformity discussed need for surgical intervention in the form of a right primary first metatarsal phalangeal joint arthrodesis.  All questions concerns regarding perioperative management including benefits risk complications and alternatives to procedure were discussed in detail.  This will serve as patient's preoperative history and physical for planned surgical intervention dated 25                                         PAST MEDICAL HISTORY     Past Medical History            Diagnosis Date    Asthma       no problems related to smoking    Bilateral bunions      GERD (gastroesophageal reflux disease)      Hyperopia with astigmatism and presbyopia      Kidney stones 2020    Osteopenia       Femur    Seasonal allergies      Vitamin D deficiency

## 2025-07-03 NOTE — OP NOTE
[facility]  Operative Note  RECORD OF OPERATION    Name Anna Bledsoe                                                             : 1960  MEDICAL RECORD NO. [id number]    DATE: 7/3/2025    Surgeon: Nikunj Alatorre DPM    Assistant:Joyce Hong, PGY II    Pre-operative Diagnosis:   Right hallux limitus  Right hallux abductovalgus deformity  Right midfoot instability    Post-operative Diagnosis:  Right hallux limitus  Right hallux abductovalgus deformity  Right midfoot instability    Procedure:   Right first metatarsophalangeal joint fusion (32611)    Anesthesia: General, block, prophylactic antibiotics    Hemostasis: A well padded pneumatic thigh tourniquet at 250 mmHG for 60 minutes    Estimated Blood Loss: Tourniquet +10 cc    Materials: [employer] Asnis 4.0 headed compression screw x 1, [employer] Ortholoc plate with corresponding 3.5 mm locking and nonlocking screws    Injectables: Not applicable    Condition: Stable    Complications: none     Specimens: Were not obtained    INDICATIONS:  The patient is a 64 y.o. female, who presented to the office with chief complaint of bilateral foot pain right worse than left.  Patient was found to have moderate findings of underlying hallux limitus with concomitant bunion deformity compassing the first metatarsal phalangeal joint as well as midfoot instability encompassing the first tarsometatarsal joint with increased intermetatarsal angle.  Patient today did attempted conservative modalities including shoe and activity modification based on crepitus with attempted range of motion as well as track bound joint we elected to proceed with surgical intervention for a primary first metatarsophalangeal joint fusion.  All questions concerns regarding perioperative management including benefits risk complications and alternatives to procedure were discussed in detail.     Patient was seen pre-operatively. Consent was reviewed and signed, operative

## 2025-07-03 NOTE — DISCHARGE INSTRUCTIONS
Discharge instructions:    Fluids and Diet  -begin with clear liquids, bouillon, dry toast, soda crackers  -If not nauseated, you may resume a regular diet when you desire    Medications  -Take prescription medications only as directed  -If pain is not severe, you may take the non-prescription medication that you normally take  -You may resume your prescription medication scheduled  -If any side effects or adverse reactions occur, discontinue the medication and contact your doctor  -review the patient drug information leaflet, when provided, before you begin taking the medication    Ambulation and Activity (nonweightbearing operative extremity)  -You are advised to go directly home from the hospital  -Use the prescribed walking aids                            - crutches/roll-a-bout/walker and surgical boot   -Do no lift or move heavy objects  -Do not Drive    Post Anesthesia Instructions  -Do not drive or operate machinery  -Do not consume alcohol, tranquilizers, sleeping medications, or a non-prescription pain medication  -Do not make important decisions  -You should have someone home with you tonight    Care of the Operative Site  -Keep cast or bandage clean, dry, and intact  -Do not shower  -Do not remove bandage  -Elevate Operative extremity as much as possible to reduce swelling and discomfort for the first 72 hours  -Apply ice bag wrapped in thick towel over bandage 20 minutes of every hour for the first 72 hours while awake  -Do not attempt to put anything between the cast or dressing ans skin, some itching is normal    Special Instructions: Call doctor immediately if you develop any of the following:  -Fever over 100 degrees by mouth - take your temperature daily  -Pain not relieved by medication ordered  -Swelling, increased redness, warmth, or hardness around operative area  -Numb, tingling or cold toes  -Toe(s) become white or bluish  -Bandage becomes wet, soiled, or blood soaked (a small amount of

## 2025-07-03 NOTE — ANESTHESIA PRE PROCEDURE
Department of Anesthesiology  Preprocedure Note       Name:  Anna Bledsoe   Age:  64 y.o.  :  1960                                          MRN:  [id number]         Date:  7/3/2025      Surgeon: Surgeon(s):  Nikunj Alatorre DPM    Procedure: Procedure(s):  Right Foot 1st Metatarsal Phalangeal Joint Fusion    Medications prior to admission:   Prior to Admission medications    Medication Sig Start Date End Date Taking? Authorizing Provider   [facility] [place] Natural Products (AIRBORNE ELDERBERRY) 100-50 MG CHEW Take by mouth    Hal Grubbs MD   montelukast (SINGULAIR) 10 MG tablet Take 1 tablet by mouth nightly 25   Hal Grubbs MD   rosuvastatin (CRESTOR) 20 MG tablet Take 1 tablet by mouth nightly 11/15/24   Hal Grubbs MD   spironolactone (ALDACTONE) 25 MG tablet TAKE 1 TABLET BY MOUTH DAILY 25   Shaila Lara, APRN - CNP   TURMERIC PO Take 1 capsule by mouth in the morning, at noon, and at bedtime    Hal Grubbs MD   losartan (COZAAR) 50 MG tablet Take 1 tablet by mouth daily 10/7/24   Shaila Lara, APRN - CNP   albuterol sulfate  (90 Base) MCG/ACT inhaler INHALE 2 PUFFS BY MOUTH EVERY 4 HOURS AS NEEDED FOR WHEEZING FOR SHORTNESS OF BREATH 22   Hal Grubbs MD   fluticasone (FLONASE) 50 MCG/ACT nasal spray 1 spray by Nasal route daily 21   Hal Grubbs MD   aspirin 81 MG EC tablet Take 1 tablet by mouth daily    Hal Grubbs MD   Coenzyme Q10 (CO Q 10) 10 MG CAPS Take 10 mg by mouth daily    Hal Grubbs MD   Esomeprazole Magnesium 20 MG TBEC Take 20 mg by mouth daily    Hal Grubbs MD   cetirizine (ZYRTEC) 10 MG tablet Take 1 tablet by mouth daily    Hal Grubbs MD   Multiple Vitamins-Minerals (ANTIOXIDANT PO) Take 1 tablet by mouth daily ALIVE    Hal Grubbs MD   vitamin C (ASCORBIC ACID) 500 MG tablet Take 1 tablet by mouth daily    Hal Grubbs MD

## 2025-07-07 DIAGNOSIS — M20.5X1 HALLUX LIMITUS OF RIGHT FOOT: ICD-10-CM

## 2025-07-07 DIAGNOSIS — Z98.890 STATUS POST SURGERY: Primary | ICD-10-CM

## 2025-07-17 ENCOUNTER — HOSPITAL ENCOUNTER (OUTPATIENT)
Dept: GENERAL RADIOLOGY | Age: 65
Discharge: HOME | End: 2025-07-19
Payer: MEDICARE

## 2025-07-17 ENCOUNTER — OFFICE VISIT (OUTPATIENT)
Dept: ORTHOPEDIC SURGERY | Age: 65
End: 2025-07-17
Payer: MEDICARE

## 2025-07-17 VITALS
HEIGHT: 64 IN | WEIGHT: 237 LBS | SYSTOLIC BLOOD PRESSURE: 130 MMHG | HEART RATE: 77 BPM | RESPIRATION RATE: 18 BRPM | OXYGEN SATURATION: 98 % | DIASTOLIC BLOOD PRESSURE: 80 MMHG | BODY MASS INDEX: 40.46 KG/M2

## 2025-07-17 DIAGNOSIS — M20.5X1 HALLUX LIMITUS OF RIGHT FOOT: ICD-10-CM

## 2025-07-17 DIAGNOSIS — Z98.890 STATUS POST SURGERY: ICD-10-CM

## 2025-07-17 DIAGNOSIS — Z98.890 STATUS POST SURGERY: Primary | ICD-10-CM

## 2025-07-17 PROCEDURE — 29405 APPL SHORT LEG CAST: CPT | Performed by: PODIATRIST

## 2025-07-17 PROCEDURE — 73620 X-RAY EXAM OF FOOT: CPT

## 2025-07-17 PROCEDURE — 99213 OFFICE O/P EST LOW 20 MIN: CPT | Performed by: PODIATRIST

## 2025-07-17 NOTE — PROGRESS NOTES
.          [facility]         Progress and Procedure Note      Anna Bledsoe  MEDICAL RECORD NUMBER:  [id number]  AGE: 64 y.o.   GENDER: female  : 1960  EPISODE DATE:  2025    Subjective:     Chief Complaint   Patient presents with    Post-Op Check     2 week right foot first metatarsal phalangeal joint fusion         HISTORY of PRESENT ILLNESS HPI     Anna Bledsoe is a 64 y.o. female patient is a pleasant 64-year-old female following from surgical reconstruction of the right forefoot dated 7/3/2025 in the form of a primary right first metatarsophalangeal joint fusion.  Incision site appears well coapted maintained.  Updated plain from x-rays were obtained reviewed detail discussed at length.  Patient was placed in a below-knee nonweightbearing cast.  Follow-up in 2 weeks.  No refills provided at today's visit.        PAST MEDICAL HISTORY        Diagnosis Date    Asthma     no problems related to smoking    Bilateral bunions     GERD (gastroesophageal reflux disease)     Hyperopia with astigmatism and presbyopia     Kidney stones     Mitral valve prolapse     Osteopenia     Femur    Seasonal allergies     Vitamin D deficiency        PAST SURGICAL HISTORY    Past Surgical History:   Procedure Laterality Date    BREAST BIOPSY Bilateral 3/2013    2011 as well all negative    CATARACT REMOVAL Right 2020    [facility]    COLONOSCOPY  7/3/2013    Biopsies x2, hyperplastic polyps.    DILATION AND CURETTAGE OF UTERUS  1989    DILATION AND CURETTAGE OF UTERUS  1978    ENDOMETRIAL ABLATION  11/10/2003    EUA, hysteroscopy, suction curettage, thermal ablation of endometrial cavity.    EYE SURGERY Right 2020    in [place]   macula repair    FOOT SURGERY Right 7/3/2025    Right Foot 1st Metatarsal Phalangeal Joint Fusion performed by Nikunj Alatorre DPM at [facility] OR    HIP SURGERY Right 2019    had done in [place] with Dr Phipps at [facility]    HYSTERECTOMY (CERVIX

## 2025-07-28 DIAGNOSIS — Z98.890 STATUS POST SURGERY: Primary | ICD-10-CM

## 2025-07-28 DIAGNOSIS — M20.5X1 HALLUX LIMITUS OF RIGHT FOOT: ICD-10-CM

## 2025-08-07 ENCOUNTER — HOSPITAL ENCOUNTER (OUTPATIENT)
Dept: GENERAL RADIOLOGY | Age: 65
Discharge: HOME | End: 2025-08-09
Payer: MEDICARE

## 2025-08-07 ENCOUNTER — OFFICE VISIT (OUTPATIENT)
Dept: ORTHOPEDIC SURGERY | Age: 65
End: 2025-08-07
Payer: MEDICARE

## 2025-08-07 VITALS
SYSTOLIC BLOOD PRESSURE: 138 MMHG | DIASTOLIC BLOOD PRESSURE: 80 MMHG | OXYGEN SATURATION: 97 % | HEIGHT: 64 IN | WEIGHT: 237 LBS | BODY MASS INDEX: 40.46 KG/M2 | HEART RATE: 80 BPM | RESPIRATION RATE: 18 BRPM

## 2025-08-07 DIAGNOSIS — M20.5X1 HALLUX LIMITUS OF RIGHT FOOT: ICD-10-CM

## 2025-08-07 DIAGNOSIS — Z98.890 STATUS POST SURGERY: ICD-10-CM

## 2025-08-07 DIAGNOSIS — Z98.890 STATUS POST SURGERY: Primary | ICD-10-CM

## 2025-08-07 PROCEDURE — 99213 OFFICE O/P EST LOW 20 MIN: CPT | Performed by: PODIATRIST

## 2025-08-07 PROCEDURE — 73630 X-RAY EXAM OF FOOT: CPT

## 2025-08-14 RX ORDER — LOSARTAN POTASSIUM 50 MG/1
50 TABLET ORAL DAILY
Qty: 90 TABLET | Refills: 3 | Status: SHIPPED | OUTPATIENT
Start: 2025-08-14

## 2025-09-03 DIAGNOSIS — Z98.890 STATUS POST SURGERY: Primary | ICD-10-CM

## 2025-09-04 ENCOUNTER — OFFICE VISIT (OUTPATIENT)
Dept: ORTHOPEDIC SURGERY | Age: 65
End: 2025-09-04
Payer: MEDICARE

## 2025-09-04 ENCOUNTER — HOSPITAL ENCOUNTER (OUTPATIENT)
Dept: GENERAL RADIOLOGY | Age: 65
Discharge: HOME | End: 2025-09-06
Payer: MEDICARE

## 2025-09-04 VITALS
HEART RATE: 89 BPM | HEIGHT: 64 IN | WEIGHT: 237 LBS | RESPIRATION RATE: 20 BRPM | SYSTOLIC BLOOD PRESSURE: 136 MMHG | DIASTOLIC BLOOD PRESSURE: 70 MMHG | BODY MASS INDEX: 40.46 KG/M2 | OXYGEN SATURATION: 97 %

## 2025-09-04 DIAGNOSIS — Z98.890 STATUS POST SURGERY: ICD-10-CM

## 2025-09-04 DIAGNOSIS — M20.5X1 HALLUX LIMITUS OF RIGHT FOOT: Primary | ICD-10-CM

## 2025-09-04 PROCEDURE — 73630 X-RAY EXAM OF FOOT: CPT

## 2025-09-04 PROCEDURE — 99212 OFFICE O/P EST SF 10 MIN: CPT | Performed by: PODIATRIST

## (undated) DEVICE — BANDAGE COMPR W4INXL10YD WHITE/BEIGE E MTRX HK LOOP CLSR

## (undated) DEVICE — SYRINGE IRRIG 60ML SFT PLIABLE BLB EZ TO GRP 1 HND USE W/

## (undated) DEVICE — SPONGE,LAP,18"X18",STD,XR,ST,5/PK,40PK/C: Brand: MEDLINE

## (undated) DEVICE — SUTURE PROL SZ 3-0 L18IN NONABSORBABLE BLU L24MM FS-1 3/8 8684G

## (undated) DEVICE — C-ARMOR C-ARM EQUIPMENT COVERS CLEAR STERILE UNIVERSAL FIT 12 PER CASE: Brand: C-ARMOR

## (undated) DEVICE — TIDISHIELD BAND BAGS CLEAR POLYETHYLENE STERILE 20IN X 20IN 100 PER CASE: Brand: TIDISHIELD

## (undated) DEVICE — GLOVE ORANGE PI 8   MSG9080

## (undated) DEVICE — DRILL BIT

## (undated) DEVICE — SUTURE VICRYL + SZ 3-0 L27IN ABSRB UD L26MM SH 1/2 CIR VCP416H

## (undated) DEVICE — MDHZ MINOR EXTREMITY: Brand: MEDLINE INDUSTRIES, INC.

## (undated) DEVICE — CANNULATED DRILL

## (undated) DEVICE — PRECISION (9.0 X 0.51 X 31.0MM)

## (undated) DEVICE — SUTURE MONOCRYL SZ 3-0 L27IN ABSRB UD L24MM PS-1 3/8 CIR PRIM Y936H

## (undated) DEVICE — BANDAGE,ELASTIC,ESMARK,STERILE,4"X9',LF: Brand: MEDLINE

## (undated) DEVICE — GAUZE,SPONGE,4"X4",8PLY,STRL,LF,10/TRAY: Brand: MEDLINE

## (undated) DEVICE — BANDAGE,GAUZE,4.5"X4.1YD,STERILE,LF: Brand: MEDLINE

## (undated) DEVICE — THREADED GUIDE WIRE

## (undated) DEVICE — BNDG,ELSTC,MATRIX,STRL,4"X5YD,LF,HOOK&LP: Brand: MEDLINE

## (undated) DEVICE — GAUZE,SPONGE,FLUFF,6"X6.75",STRL,5/TRAY: Brand: MEDLINE

## (undated) DEVICE — TOWEL,OR,DSP,ST,BLUE,STD,4/PK,20PK/CS: Brand: MEDLINE

## (undated) DEVICE — PLATE TACK

## (undated) DEVICE — DUAL CUT SAGITTAL BLADE

## (undated) DEVICE — GOWN,AURORA,NON-REINFORCED,2XL: Brand: MEDLINE

## (undated) DEVICE — BANDAGE,GAUZE,BULKEE II,4.5"X4.1YD,STRL: Brand: MEDLINE

## (undated) DEVICE — PADDING,UNDERCAST,COTTON, 4"X4YD STERILE: Brand: MEDLINE

## (undated) DEVICE — BANDAGE COBAN 4 IN COMPR W4INXL5YD FOAM COHESIVE QUIK STK SELF ADH SFT